# Patient Record
Sex: FEMALE | Race: WHITE | NOT HISPANIC OR LATINO | Employment: FULL TIME | URBAN - NONMETROPOLITAN AREA
[De-identification: names, ages, dates, MRNs, and addresses within clinical notes are randomized per-mention and may not be internally consistent; named-entity substitution may affect disease eponyms.]

---

## 2023-10-05 ENCOUNTER — OFFICE VISIT (OUTPATIENT)
Dept: FAMILY MEDICINE CLINIC | Facility: CLINIC | Age: 33
End: 2023-10-05
Payer: COMMERCIAL

## 2023-10-05 VITALS
OXYGEN SATURATION: 98 % | BODY MASS INDEX: 53.92 KG/M2 | RESPIRATION RATE: 16 BRPM | SYSTOLIC BLOOD PRESSURE: 130 MMHG | WEIGHT: 293 LBS | DIASTOLIC BLOOD PRESSURE: 84 MMHG | TEMPERATURE: 98 F | HEART RATE: 111 BPM | HEIGHT: 62 IN

## 2023-10-05 DIAGNOSIS — E66.01 CLASS 3 SEVERE OBESITY DUE TO EXCESS CALORIES WITH SERIOUS COMORBIDITY AND BODY MASS INDEX (BMI) OF 50.0 TO 59.9 IN ADULT: ICD-10-CM

## 2023-10-05 DIAGNOSIS — E03.8 HYPOTHYROIDISM DUE TO HASHIMOTO'S THYROIDITIS: ICD-10-CM

## 2023-10-05 DIAGNOSIS — F33.0 MAJOR DEPRESSIVE DISORDER, RECURRENT, MILD: ICD-10-CM

## 2023-10-05 DIAGNOSIS — G47.33 OSA (OBSTRUCTIVE SLEEP APNEA): ICD-10-CM

## 2023-10-05 DIAGNOSIS — Z83.3 FAMILY HISTORY OF DIABETES MELLITUS (DM): ICD-10-CM

## 2023-10-05 DIAGNOSIS — F41.9 ANXIETY: ICD-10-CM

## 2023-10-05 DIAGNOSIS — E78.5 HYPERLIPIDEMIA, UNSPECIFIED HYPERLIPIDEMIA TYPE: ICD-10-CM

## 2023-10-05 DIAGNOSIS — E06.3 HYPOTHYROIDISM DUE TO HASHIMOTO'S THYROIDITIS: ICD-10-CM

## 2023-10-05 DIAGNOSIS — Z00.00 ROUTINE GENERAL MEDICAL EXAMINATION AT A HEALTH CARE FACILITY: Primary | ICD-10-CM

## 2023-10-05 DIAGNOSIS — R06.83 SNORING: ICD-10-CM

## 2023-10-05 DIAGNOSIS — I10 ESSENTIAL HYPERTENSION: ICD-10-CM

## 2023-10-05 PROBLEM — J02.0 STREPTOCOCCAL SORE THROAT: Status: ACTIVE | Noted: 2023-10-05

## 2023-10-05 PROBLEM — E66.813 CLASS 3 SEVERE OBESITY DUE TO EXCESS CALORIES WITH SERIOUS COMORBIDITY AND BODY MASS INDEX (BMI) OF 50.0 TO 59.9 IN ADULT: Status: ACTIVE | Noted: 2023-10-05

## 2023-10-05 PROBLEM — J01.90 ACUTE SINUSITIS: Status: ACTIVE | Noted: 2023-10-05

## 2023-10-05 PROBLEM — H66.90 OTITIS MEDIA: Status: ACTIVE | Noted: 2023-10-05

## 2023-10-05 PROBLEM — J06.9 UPPER RESPIRATORY INFECTION: Status: ACTIVE | Noted: 2023-10-05

## 2023-10-05 PROBLEM — R05.9 COUGH: Status: ACTIVE | Noted: 2023-10-05

## 2023-10-05 PROCEDURE — 99203 OFFICE O/P NEW LOW 30 MIN: CPT | Performed by: NURSE PRACTITIONER

## 2023-10-05 PROCEDURE — 99385 PREV VISIT NEW AGE 18-39: CPT | Performed by: NURSE PRACTITIONER

## 2023-10-05 RX ORDER — VENLAFAXINE HYDROCHLORIDE 150 MG/1
150 CAPSULE, EXTENDED RELEASE ORAL DAILY
Qty: 90 CAPSULE | Refills: 1 | Status: SHIPPED | OUTPATIENT
Start: 2023-10-05

## 2023-10-05 RX ORDER — THYROID 30 MG/1
30 TABLET ORAL DAILY
Qty: 90 TABLET | Refills: 1 | Status: SHIPPED | OUTPATIENT
Start: 2023-10-05

## 2023-10-05 RX ORDER — ROSUVASTATIN CALCIUM 10 MG/1
10 TABLET, COATED ORAL DAILY
Qty: 90 TABLET | Refills: 1 | Status: SHIPPED | OUTPATIENT
Start: 2023-10-05

## 2023-10-05 RX ORDER — LISINOPRIL 5 MG/1
5 TABLET ORAL DAILY
COMMUNITY
End: 2023-10-05 | Stop reason: SDUPTHER

## 2023-10-05 RX ORDER — VENLAFAXINE HYDROCHLORIDE 150 MG/1
150 CAPSULE, EXTENDED RELEASE ORAL DAILY
COMMUNITY
End: 2023-10-05 | Stop reason: SDUPTHER

## 2023-10-05 RX ORDER — LISINOPRIL 10 MG/1
10 TABLET ORAL DAILY
Qty: 90 TABLET | Refills: 1 | Status: SHIPPED | OUTPATIENT
Start: 2023-10-05

## 2023-10-05 RX ORDER — THYROID 30 MG/1
30 TABLET ORAL DAILY
COMMUNITY
End: 2023-10-05

## 2023-10-05 RX ORDER — THYROID 30 MG/1
TABLET ORAL
COMMUNITY
Start: 2023-08-25 | End: 2023-10-05 | Stop reason: SDUPTHER

## 2023-10-05 RX ORDER — ROSUVASTATIN CALCIUM 10 MG/1
10 TABLET, COATED ORAL DAILY
COMMUNITY
End: 2023-10-05 | Stop reason: SDUPTHER

## 2023-10-05 NOTE — PROGRESS NOTES
"       New Patient History and Physical      Referring Physician: No ref. provider found    Chief Complaint:    Chief Complaint   Patient presents with    Anxiety    Thyroid Problem     Thyroid issues and anxiety. Establish care        History of Present Illness:   Jamie Heath is a 32 y.o. female who presents today as a new patient.     HTN--lisinopril 5mg once daily, started about 3 months ago--reports that BP was elevated \"due to anxiety\" and eventually went back down but due to family history of HTN, provider wanted her to continue medication    HLD--Crestor 10mg once daily--patient states that she started it about 3 months ago and at the next appointment cholesterol was \"back in range\", provider stated that she wanted patient to continue medication due to family history    Mother--MI June 2022, T2DM  Father--afib, heart disease, pacemaker, HLD, HTN, CKD--passed away in June 2023--also history of T2DM    Patient states that her A1c is usually normal    Hypothyroidism, Hashimoto's--last checked about 3 months ago--takes armour thyroid 30mg once daily--patient states that she does not have any more of her thyroid medication.    Anxiety, Depression--Effexor-XR 150mg once daily--tolerating well, improvement in symptoms, Denies SI/HI    Subjective     Review of Systems   Constitutional:  Negative for fatigue.   Respiratory:  Negative for cough.    Cardiovascular:  Negative for chest pain and palpitations.   Gastrointestinal:  Negative for abdominal pain, diarrhea, nausea and vomiting.   Endocrine: Negative for polydipsia, polyphagia and polyuria.   Genitourinary:  Negative for menstrual problem and pelvic pain.   Musculoskeletal:  Negative for myalgias.   Neurological:  Negative for dizziness, weakness and headaches.   Psychiatric/Behavioral:  Negative for agitation, dysphoric mood, self-injury, sleep disturbance and suicidal ideas.       The following portions of the patient's history were reviewed and updated as " "appropriate: allergies, current medications, past family history, past medical history, past social history, past surgical history and problem list.    Past Medical History: History reviewed. No pertinent past medical history.    Past Surgical History:History reviewed. No pertinent surgical history.    Family History: family history includes Arthritis in her mother; Cancer in her maternal grandfather; Diabetes in her father and mother; Hypertension in her father; Kidney disease in her father; Mental illness in her father; Stroke in her maternal grandmother.    Social History:  reports that she has never smoked. She has never been exposed to tobacco smoke. She has never used smokeless tobacco. Alcohol use questions deferred to the physician. She reports that she does not use drugs.    Medications:    Current Outpatient Medications:     [place] Thyroid 30 MG tablet, Take 1 tablet by mouth Daily., Disp: 90 tablet, Rfl: 1    lisinopril (PRINIVIL,ZESTRIL) 10 MG tablet, Take 1 tablet by mouth Daily., Disp: 90 tablet, Rfl: 1    rosuvastatin (CRESTOR) 10 MG tablet, Take 1 tablet by mouth Daily., Disp: 90 tablet, Rfl: 1    venlafaxine XR (EFFEXOR-XR) 150 MG 24 hr capsule, Take 1 capsule by mouth Daily., Disp: 90 capsule, Rfl: 1    Allergies:  No Known Allergies    Objective     Vital Signs:   /84   Pulse 111   Temp 98 øF (36.7 øC)   Resp 16   Ht 157.5 cm (62\")   Wt 134 kg (295 lb 9.6 oz)   SpO2 98%   BMI 54.07 kg/mý      Class 3 Severe Obesity (BMI >=40). Obesity-related health conditions include the following: hypertension and dyslipidemias. Obesity is newly identified. BMI is is above average; BMI management plan is completed. We discussed Information on healthy weight added to patient's after visit summary.       Physical Exam:  Physical Exam  Vitals and nursing note reviewed.   HENT:      Right Ear: Tympanic membrane normal.      Left Ear: Tympanic membrane normal.      Mouth/Throat:      Pharynx: No " posterior oropharyngeal erythema or uvula swelling.      Comments: Large tongue, crowded airway  Eyes:      Extraocular Movements: Extraocular movements intact.      Pupils: Pupils are equal, round, and reactive to light.   Neck:      Thyroid: No thyroid mass or thyromegaly.      Vascular: Normal carotid pulses. No carotid bruit or JVD.   Cardiovascular:      Rate and Rhythm: Normal rate and regular rhythm.   Pulmonary:      Effort: Pulmonary effort is normal.      Breath sounds: Normal breath sounds.   Abdominal:      General: Bowel sounds are normal.   Musculoskeletal:         General: Normal range of motion.   Lymphadenopathy:      Cervical: No cervical adenopathy.   Skin:     General: Skin is warm and dry.      Capillary Refill: Capillary refill takes less than 2 seconds.   Neurological:      Mental Status: She is alert and oriented to person, place, and time.   Psychiatric:         Mood and Affect: Mood normal.         Behavior: Behavior normal.       Assessment / Plan     Assessment/Plan:   Diagnoses and all orders for this visit:    1. Routine general medical examination at a health care facility (Primary)  -     CBC w AUTO Differential  -     Comprehensive metabolic panel  -     Lipid Panel  -     Hemoglobin A1c  -     TSH  -     T4, free    2. Hypothyroidism due to Hashimoto's thyroiditis  -      Thyroid  -     TSH  -     T4, free  -     [place] Thyroid 30 MG tablet; Take 1 tablet by mouth Daily.  Dispense: 90 tablet; Refill: 1    3. Hyperlipidemia, unspecified hyperlipidemia type  -     rosuvastatin (CRESTOR) 10 MG tablet; Take 1 tablet by mouth Daily.  Dispense: 90 tablet; Refill: 1    4. Essential hypertension  -     lisinopril (PRINIVIL,ZESTRIL) 10 MG tablet; Take 1 tablet by mouth Daily.  Dispense: 90 tablet; Refill: 1    5. Family history of diabetes mellitus (DM)  -     Hemoglobin A1c    6. Class 3 severe obesity due to excess calories with serious comorbidity and body mass index (BMI) of 50.0 to  59.9 in adult  -     Hemoglobin A1c    7. Anxiety  -     venlafaxine XR (EFFEXOR-XR) 150 MG 24 hr capsule; Take 1 capsule by mouth Daily.  Dispense: 90 capsule; Refill: 1    8. Major depressive disorder, recurrent, mild  -     venlafaxine XR (EFFEXOR-XR) 150 MG 24 hr capsule; Take 1 capsule by mouth Daily.  Dispense: 90 capsule; Refill: 1    9. Snoring  -     Ambulatory Referral to Sleep Medicine    10. GREG (obstructive sleep apnea)  -     Ambulatory Referral to Sleep Medicine    I will contact patient regarding test results and provide instructions regarding any necessary changes in plan of care.    Patient was encouraged to keep me informed of any acute changes, lack of improvement, or any new concerning symptoms.    Patient voiced understanding of all instructions and denied further questions.      I have reviewed and updated all copied forward information, as appropriate.  I attest to the accuracy and relevance of any unchanged information.        Follow up:  Return in about 3 months (around 1/5/2024).     Patient Education:  There are no Patient Instructions on file for this visit.    GRACIELA Jaffe  10/05/23  15:14 EDT    Please note that portions of this note may have been completed with a voice recognition program.

## 2023-10-06 ENCOUNTER — PATIENT ROUNDING (BHMG ONLY) (OUTPATIENT)
Dept: FAMILY MEDICINE CLINIC | Facility: CLINIC | Age: 33
End: 2023-10-06
Payer: COMMERCIAL

## 2023-10-06 LAB
ALBUMIN SERPL-MCNC: 4.6 G/DL (ref 3.9–4.9)
ALBUMIN/GLOB SERPL: 1.6 {RATIO} (ref 1.2–2.2)
ALP SERPL-CCNC: 65 IU/L (ref 44–121)
ALT SERPL-CCNC: 20 IU/L (ref 0–32)
AST SERPL-CCNC: 21 IU/L (ref 0–40)
BASOPHILS # BLD AUTO: 0 X10E3/UL (ref 0–0.2)
BASOPHILS NFR BLD AUTO: 0 %
BILIRUB SERPL-MCNC: <0.2 MG/DL (ref 0–1.2)
BUN SERPL-MCNC: 15 MG/DL (ref 6–20)
BUN/CREAT SERPL: 19 (ref 9–23)
CALCIUM SERPL-MCNC: 9.6 MG/DL (ref 8.7–10.2)
CHLORIDE SERPL-SCNC: 100 MMOL/L (ref 96–106)
CHOLEST SERPL-MCNC: 174 MG/DL (ref 100–199)
CO2 SERPL-SCNC: 25 MMOL/L (ref 20–29)
CREAT SERPL-MCNC: 0.81 MG/DL (ref 0.57–1)
EGFRCR SERPLBLD CKD-EPI 2021: 98 ML/MIN/1.73
EOSINOPHIL # BLD AUTO: 0 X10E3/UL (ref 0–0.4)
EOSINOPHIL NFR BLD AUTO: 0 %
ERYTHROCYTE [DISTWIDTH] IN BLOOD BY AUTOMATED COUNT: 13 % (ref 11.7–15.4)
GLOBULIN SER CALC-MCNC: 2.8 G/DL (ref 1.5–4.5)
GLUCOSE SERPL-MCNC: 91 MG/DL (ref 70–99)
HBA1C MFR BLD: 5 % (ref 4.8–5.6)
HCT VFR BLD AUTO: 35.3 % (ref 34–46.6)
HDLC SERPL-MCNC: 40 MG/DL
HGB BLD-MCNC: 11.3 G/DL (ref 11.1–15.9)
IMM GRANULOCYTES # BLD AUTO: 0 X10E3/UL (ref 0–0.1)
IMM GRANULOCYTES NFR BLD AUTO: 0 %
LDLC SERPL CALC-MCNC: 102 MG/DL (ref 0–99)
LYMPHOCYTES # BLD AUTO: 2.3 X10E3/UL (ref 0.7–3.1)
LYMPHOCYTES NFR BLD AUTO: 38 %
MCH RBC QN AUTO: 27.4 PG (ref 26.6–33)
MCHC RBC AUTO-ENTMCNC: 32 G/DL (ref 31.5–35.7)
MCV RBC AUTO: 86 FL (ref 79–97)
MONOCYTES # BLD AUTO: 0.5 X10E3/UL (ref 0.1–0.9)
MONOCYTES NFR BLD AUTO: 8 %
NEUTROPHILS # BLD AUTO: 3.3 X10E3/UL (ref 1.4–7)
NEUTROPHILS NFR BLD AUTO: 54 %
PLATELET # BLD AUTO: 259 X10E3/UL (ref 150–450)
POTASSIUM SERPL-SCNC: 4 MMOL/L (ref 3.5–5.2)
PROT SERPL-MCNC: 7.4 G/DL (ref 6–8.5)
RBC # BLD AUTO: 4.12 X10E6/UL (ref 3.77–5.28)
SODIUM SERPL-SCNC: 138 MMOL/L (ref 134–144)
T4 FREE SERPL-MCNC: 0.97 NG/DL (ref 0.82–1.77)
TRIGL SERPL-MCNC: 184 MG/DL (ref 0–149)
TSH SERPL DL<=0.005 MIU/L-ACNC: 1.52 UIU/ML (ref 0.45–4.5)
VLDLC SERPL CALC-MCNC: 32 MG/DL (ref 5–40)
WBC # BLD AUTO: 6.1 X10E3/UL (ref 3.4–10.8)

## 2024-02-09 ENCOUNTER — OFFICE VISIT (OUTPATIENT)
Dept: FAMILY MEDICINE CLINIC | Facility: CLINIC | Age: 34
End: 2024-02-09
Payer: COMMERCIAL

## 2024-02-09 VITALS
RESPIRATION RATE: 16 BRPM | SYSTOLIC BLOOD PRESSURE: 130 MMHG | BODY MASS INDEX: 53.92 KG/M2 | OXYGEN SATURATION: 99 % | TEMPERATURE: 97.9 F | WEIGHT: 293 LBS | DIASTOLIC BLOOD PRESSURE: 86 MMHG | HEART RATE: 122 BPM | HEIGHT: 62 IN

## 2024-02-09 DIAGNOSIS — I10 ESSENTIAL HYPERTENSION: ICD-10-CM

## 2024-02-09 DIAGNOSIS — E61.1 IRON DEFICIENCY: ICD-10-CM

## 2024-02-09 DIAGNOSIS — E78.5 DYSLIPIDEMIA: ICD-10-CM

## 2024-02-09 DIAGNOSIS — E06.3 HYPOTHYROIDISM DUE TO HASHIMOTO'S THYROIDITIS: Primary | ICD-10-CM

## 2024-02-09 DIAGNOSIS — E03.8 HYPOTHYROIDISM DUE TO HASHIMOTO'S THYROIDITIS: Primary | ICD-10-CM

## 2024-02-09 DIAGNOSIS — R53.83 OTHER FATIGUE: ICD-10-CM

## 2024-02-09 DIAGNOSIS — E55.9 VITAMIN D DEFICIENCY: ICD-10-CM

## 2024-02-09 DIAGNOSIS — F33.0 MAJOR DEPRESSIVE DISORDER, RECURRENT, MILD: ICD-10-CM

## 2024-02-09 DIAGNOSIS — F41.9 ANXIETY: ICD-10-CM

## 2024-02-09 DIAGNOSIS — E53.8 B12 DEFICIENCY: ICD-10-CM

## 2024-02-09 RX ORDER — LISINOPRIL 10 MG/1
10 TABLET ORAL DAILY
Qty: 90 TABLET | Refills: 1 | Status: SHIPPED | OUTPATIENT
Start: 2024-02-09

## 2024-02-09 RX ORDER — VENLAFAXINE HYDROCHLORIDE 150 MG/1
150 CAPSULE, EXTENDED RELEASE ORAL DAILY
Qty: 90 CAPSULE | Refills: 1 | Status: SHIPPED | OUTPATIENT
Start: 2024-02-09

## 2024-02-09 RX ORDER — ROSUVASTATIN CALCIUM 10 MG/1
10 TABLET, COATED ORAL DAILY
Qty: 90 TABLET | Refills: 1 | Status: SHIPPED | OUTPATIENT
Start: 2024-02-09

## 2024-02-09 NOTE — PROGRESS NOTES
"       New Patient History and Physical      Referring Physician: No ref. provider found    Chief Complaint:    Chief Complaint   Patient presents with    Hypothyroidism     Pt is here to have labs done and to follow up on medications          History of Present Illness:   Jamie Heath is a 33 y.o. female who presents today for follow up of hypothyroidism and concerns of fatigue and dizziness.     Patient reports that she is exhausted \"all the time\". Also states that she gets \"woozy\" when she stands up, intermittently.     Curently prescribed armour thyroid 30mg daily.    HTN--Lisinopril 10mg daily, tolerating well, denies AE, chest pain, SOA--has been experiencing intermittent dizziness upon standing    HLD--rosuvastatin 10mg daily--tolerating well    Subjective     Review of Systems   Constitutional:  Positive for fatigue.   Respiratory:  Negative for cough.    Cardiovascular:  Negative for chest pain and palpitations.   Gastrointestinal:  Negative for abdominal pain, diarrhea, nausea and vomiting.   Endocrine: Negative for polydipsia, polyphagia and polyuria.   Genitourinary:  Negative for menstrual problem and pelvic pain.   Musculoskeletal:  Negative for myalgias.   Neurological:  Positive for dizziness. Negative for weakness and headaches.   Psychiatric/Behavioral:  Negative for agitation, dysphoric mood, self-injury, sleep disturbance and suicidal ideas.         The following portions of the patient's history were reviewed and updated as appropriate: allergies, current medications, past family history, past medical history, past social history, past surgical history and problem list.    Past Medical History: History reviewed. No pertinent past medical history.    Past Surgical History:History reviewed. No pertinent surgical history.    Family History: family history includes Arthritis in her mother; Cancer in her maternal grandfather; Diabetes in her father and mother; Hypertension in her father; Kidney " "disease in her father; Mental illness in her father; Stroke in her maternal grandmother.    Social History:  reports that she has never smoked. She has never been exposed to tobacco smoke. She has never used smokeless tobacco. Alcohol use questions deferred to the physician. She reports that she does not use drugs.    Medications:    Current Outpatient Medications:     [place] Thyroid 30 MG tablet, Take 1 tablet by mouth Daily., Disp: 90 tablet, Rfl: 1    lisinopril (PRINIVIL,ZESTRIL) 10 MG tablet, Take 1 tablet by mouth Daily., Disp: 90 tablet, Rfl: 1    rosuvastatin (CRESTOR) 10 MG tablet, Take 1 tablet by mouth Daily., Disp: 90 tablet, Rfl: 1    venlafaxine XR (EFFEXOR-XR) 150 MG 24 hr capsule, Take 1 capsule by mouth Daily., Disp: 90 capsule, Rfl: 1    Allergies:  No Known Allergies    Objective     Vital Signs:   /86   Pulse (!) 122   Temp 97.9 °F (36.6 °C)   Resp 16   Ht 157.5 cm (62\")   Wt 136 kg (299 lb 6.4 oz)   SpO2 99%   BMI 54.76 kg/m²      Class 3 Severe Obesity (BMI >=40). Obesity-related health conditions include the following: hypertension and dyslipidemias. Obesity is newly identified. BMI is is above average; BMI management plan is completed. We discussed Information on healthy weight added to patient's after visit summary.       Physical Exam:  Physical Exam  Vitals and nursing note reviewed.   Constitutional:       Appearance: She is obese.   Neck:      Thyroid: No thyroid mass or thyromegaly.   Cardiovascular:      Rate and Rhythm: Normal rate and regular rhythm.   Pulmonary:      Effort: Pulmonary effort is normal.      Breath sounds: Normal breath sounds.   Neurological:      Mental Status: She is alert and oriented to person, place, and time.   Psychiatric:         Mood and Affect: Mood normal.         Behavior: Behavior normal.         Assessment / Plan     Assessment/Plan:   Diagnoses and all orders for this visit:    1. Hypothyroidism due to Hashimoto's thyroiditis (Primary)  - "     TSH  -     T4, free    2. Essential hypertension  -     Comprehensive metabolic panel  -     lisinopril (PRINIVIL,ZESTRIL) 10 MG tablet; Take 1 tablet by mouth Daily.  Dispense: 90 tablet; Refill: 1    3. Vitamin D deficiency  -     Vitamin D 25 hydroxy    4. B12 deficiency  -     Vitamin B12    5. Iron deficiency  -     Ferritin    6. Other fatigue  -     CBC w AUTO Differential  -     Comprehensive metabolic panel    7. Dyslipidemia  -     Lipid Panel  -     rosuvastatin (CRESTOR) 10 MG tablet; Take 1 tablet by mouth Daily.  Dispense: 90 tablet; Refill: 1    8. Anxiety  -     venlafaxine XR (EFFEXOR-XR) 150 MG 24 hr capsule; Take 1 capsule by mouth Daily.  Dispense: 90 capsule; Refill: 1    9. Major depressive disorder, recurrent, mild  -     venlafaxine XR (EFFEXOR-XR) 150 MG 24 hr capsule; Take 1 capsule by mouth Daily.  Dispense: 90 capsule; Refill: 1      I will contact patient regarding test results and provide instructions regarding any necessary changes in plan of care.    Patient was encouraged to keep me informed of any acute changes, lack of improvement, or any new concerning symptoms.    Patient voiced understanding of all instructions and denied further questions.      I have reviewed and updated all copied forward information, as appropriate.  I attest to the accuracy and relevance of any unchanged information.        Follow up:  Return in about 3 months (around 5/9/2024).     Patient Education:  There are no Patient Instructions on file for this visit.    GRACIELA Jaffe  02/27/24  16:05 EST    Please note that portions of this note may have been completed with a voice recognition program.    Declines

## 2024-02-10 LAB
25(OH)D3+25(OH)D2 SERPL-MCNC: 24.7 NG/ML (ref 30–100)
ALBUMIN SERPL-MCNC: 4.4 G/DL (ref 3.5–5.2)
ALBUMIN/GLOB SERPL: 1.9 G/DL
ALP SERPL-CCNC: 71 U/L (ref 39–117)
ALT SERPL-CCNC: 17 U/L (ref 1–33)
AST SERPL-CCNC: 21 U/L (ref 1–32)
BASOPHILS # BLD AUTO: 0 10*3/MM3 (ref 0–0.2)
BASOPHILS NFR BLD AUTO: 0 % (ref 0–1.5)
BILIRUB SERPL-MCNC: 0.3 MG/DL (ref 0–1.2)
BUN SERPL-MCNC: 14 MG/DL (ref 6–20)
BUN/CREAT SERPL: 15.7 (ref 7–25)
CALCIUM SERPL-MCNC: 9.5 MG/DL (ref 8.6–10.5)
CHLORIDE SERPL-SCNC: 104 MMOL/L (ref 98–107)
CHOLEST SERPL-MCNC: 141 MG/DL (ref 0–200)
CO2 SERPL-SCNC: 23.3 MMOL/L (ref 22–29)
CREAT SERPL-MCNC: 0.89 MG/DL (ref 0.57–1)
EGFRCR SERPLBLD CKD-EPI 2021: 86.8 ML/MIN/1.73
EOSINOPHIL # BLD AUTO: 0 10*3/MM3 (ref 0–0.4)
EOSINOPHIL NFR BLD AUTO: 0 % (ref 0.3–6.2)
ERYTHROCYTE [DISTWIDTH] IN BLOOD BY AUTOMATED COUNT: 14.5 % (ref 12.3–15.4)
FERRITIN SERPL-MCNC: 13.8 NG/ML (ref 13–150)
GLOBULIN SER CALC-MCNC: 2.3 GM/DL
GLUCOSE SERPL-MCNC: 185 MG/DL (ref 65–99)
HCT VFR BLD AUTO: 33 % (ref 34–46.6)
HDLC SERPL-MCNC: 42 MG/DL (ref 40–60)
HGB BLD-MCNC: 10.8 G/DL (ref 12–15.9)
IMM GRANULOCYTES # BLD AUTO: 0.01 10*3/MM3 (ref 0–0.05)
IMM GRANULOCYTES NFR BLD AUTO: 0.2 % (ref 0–0.5)
LDLC SERPL CALC-MCNC: 71 MG/DL (ref 0–100)
LYMPHOCYTES # BLD AUTO: 2.2 10*3/MM3 (ref 0.7–3.1)
LYMPHOCYTES NFR BLD AUTO: 35 % (ref 19.6–45.3)
MCH RBC QN AUTO: 26.8 PG (ref 26.6–33)
MCHC RBC AUTO-ENTMCNC: 32.7 G/DL (ref 31.5–35.7)
MCV RBC AUTO: 81.9 FL (ref 79–97)
MONOCYTES # BLD AUTO: 0.33 10*3/MM3 (ref 0.1–0.9)
MONOCYTES NFR BLD AUTO: 5.2 % (ref 5–12)
NEUTROPHILS # BLD AUTO: 3.75 10*3/MM3 (ref 1.7–7)
NEUTROPHILS NFR BLD AUTO: 59.6 % (ref 42.7–76)
NRBC BLD AUTO-RTO: 0 /100 WBC (ref 0–0.2)
PLATELET # BLD AUTO: 326 10*3/MM3 (ref 140–450)
POTASSIUM SERPL-SCNC: 4.2 MMOL/L (ref 3.5–5.2)
PROT SERPL-MCNC: 6.7 G/DL (ref 6–8.5)
RBC # BLD AUTO: 4.03 10*6/MM3 (ref 3.77–5.28)
SODIUM SERPL-SCNC: 138 MMOL/L (ref 136–145)
T4 FREE SERPL-MCNC: 0.96 NG/DL (ref 0.93–1.7)
TRIGL SERPL-MCNC: 162 MG/DL (ref 0–150)
TSH SERPL DL<=0.005 MIU/L-ACNC: 2.12 UIU/ML (ref 0.27–4.2)
VIT B12 SERPL-MCNC: 414 PG/ML (ref 211–946)
VLDLC SERPL CALC-MCNC: 28 MG/DL (ref 5–40)
WBC # BLD AUTO: 6.29 10*3/MM3 (ref 3.4–10.8)

## 2024-10-23 DIAGNOSIS — F41.9 ANXIETY: ICD-10-CM

## 2024-10-23 DIAGNOSIS — F33.0 MAJOR DEPRESSIVE DISORDER, RECURRENT, MILD: ICD-10-CM

## 2024-10-23 RX ORDER — VENLAFAXINE HYDROCHLORIDE 150 MG/1
150 CAPSULE, EXTENDED RELEASE ORAL DAILY
Qty: 90 CAPSULE | Refills: 1 | Status: SHIPPED | OUTPATIENT
Start: 2024-10-23

## 2024-11-01 ENCOUNTER — OFFICE VISIT (OUTPATIENT)
Dept: FAMILY MEDICINE CLINIC | Facility: CLINIC | Age: 34
End: 2024-11-01
Payer: COMMERCIAL

## 2024-11-01 VITALS
BODY MASS INDEX: 52.44 KG/M2 | SYSTOLIC BLOOD PRESSURE: 128 MMHG | WEIGHT: 285 LBS | HEIGHT: 62 IN | HEART RATE: 99 BPM | DIASTOLIC BLOOD PRESSURE: 84 MMHG | OXYGEN SATURATION: 98 %

## 2024-11-01 DIAGNOSIS — E78.5 DYSLIPIDEMIA: ICD-10-CM

## 2024-11-01 DIAGNOSIS — I10 ESSENTIAL HYPERTENSION: ICD-10-CM

## 2024-11-01 DIAGNOSIS — F90.2 ATTENTION DEFICIT HYPERACTIVITY DISORDER (ADHD), COMBINED TYPE: ICD-10-CM

## 2024-11-01 DIAGNOSIS — F33.0 MAJOR DEPRESSIVE DISORDER, RECURRENT, MILD: ICD-10-CM

## 2024-11-01 DIAGNOSIS — F41.9 ANXIETY: Primary | ICD-10-CM

## 2024-11-01 DIAGNOSIS — E06.3 HYPOTHYROIDISM DUE TO HASHIMOTO'S THYROIDITIS: ICD-10-CM

## 2024-11-01 PROCEDURE — 99214 OFFICE O/P EST MOD 30 MIN: CPT | Performed by: NURSE PRACTITIONER

## 2024-11-01 RX ORDER — DESVENLAFAXINE 25 MG/1
TABLET, EXTENDED RELEASE ORAL
Qty: 147 TABLET | Refills: 0 | Status: SHIPPED | OUTPATIENT
Start: 2024-11-01 | End: 2025-01-17

## 2024-11-01 RX ORDER — VENLAFAXINE HYDROCHLORIDE 150 MG/1
150 CAPSULE, EXTENDED RELEASE ORAL DAILY
Qty: 90 CAPSULE | Refills: 1 | Status: SHIPPED | OUTPATIENT
Start: 2024-11-01 | End: 2024-11-01

## 2024-11-01 RX ORDER — THYROID 30 MG/1
30 TABLET ORAL DAILY
Qty: 90 TABLET | Refills: 1 | Status: SHIPPED | OUTPATIENT
Start: 2024-11-01

## 2024-11-01 RX ORDER — ROSUVASTATIN CALCIUM 10 MG/1
10 TABLET, COATED ORAL DAILY
Qty: 90 TABLET | Refills: 1 | Status: SHIPPED | OUTPATIENT
Start: 2024-11-01

## 2024-11-01 RX ORDER — LISINOPRIL 10 MG/1
10 TABLET ORAL DAILY
Qty: 90 TABLET | Refills: 1 | Status: SHIPPED | OUTPATIENT
Start: 2024-11-01 | End: 2024-11-01

## 2024-11-01 NOTE — PROGRESS NOTES
Established Patient        Chief Complaint:   Chief Complaint   Patient presents with    Med Refill         History of Present Illness:    Jamie Heath is a 33 y.o. female who presents today for follow up and medication refill.  Patient has been without medical insurance and has been unable to refill his prescription medications.    Anxiety, Depression  Effrexor-XR 150mg daily previously prescribed. Has not taken in 3 weeks.  Would like to try different medication. Does not notice much of a difference without the medication.     HLD  Crestor 10mg daily previously prescribed  Has not taken in 2 months due to change in insurance.    Hypothyroidism  Previously on armour thyroid 30mg daily--has not taken in 1 month.  Tolerated well    HTN  Previously prescribed Lisinopril 10mg daily. Tolerated well.     Denies chest pain, SOA, palpitations, severe HA, confusion, visual disturbance, dizziness.     Denies SI/HI.     Reports difficulty focusing on tasks. Concerned that she may have ADHD.     Subjective     The following portions of the patient's history were reviewed and updated as appropriate: allergies, current medications, past family history, past medical history, past social history, past surgical history and problem list.    ALLERGIES  No Known Allergies    ROS  Review of Systems   Constitutional:  Negative for unexpected weight change.   Eyes:  Negative for visual disturbance.   Respiratory:  Negative for chest tightness and shortness of breath.    Cardiovascular:  Negative for chest pain and palpitations.   Endocrine: Negative for polydipsia, polyphagia and polyuria.   Neurological:  Negative for dizziness and light-headedness.   Psychiatric/Behavioral:  Positive for agitation, decreased concentration and dysphoric mood. Negative for confusion, self-injury and suicidal ideas. The patient is nervous/anxious.    All other systems reviewed and are negative.        Objective     Vital Signs:   BP  "128/84   Pulse 99   Ht 157.5 cm (62\")   Wt 129 kg (285 lb)   SpO2 98%   BMI 52.13 kg/m²                Physical Exam   Physical Exam  Vitals and nursing note reviewed.   HENT:      Nose: Nose normal.      Mouth/Throat:      Lips: Pink.   Eyes:      General: Lids are normal.      Conjunctiva/sclera: Conjunctivae normal.      Pupils: Pupils are equal, round, and reactive to light.   Cardiovascular:      Rate and Rhythm: Normal rate and regular rhythm.      Heart sounds: Normal heart sounds.   Pulmonary:      Effort: Pulmonary effort is normal.      Breath sounds: Normal breath sounds.   Neurological:      Mental Status: She is alert and oriented to person, place, and time.      Gait: Gait is intact.   Psychiatric:         Attention and Perception: Attention normal.         Mood and Affect: Mood and affect normal.         Speech: Speech normal.         Behavior: Behavior normal. Behavior is cooperative.         Assessment and Plan      Assessment/Plan:   Diagnoses and all orders for this visit:    1. Anxiety (Primary)  -     Discontinue: venlafaxine XR (EFFEXOR-XR) 150 MG 24 hr capsule; Take 1 capsule by mouth Daily.  Dispense: 90 capsule; Refill: 1  -     Desvenlafaxine Succinate ER (Pristiq) 25 MG tablet sustained-release 24 hour; Take 1 tablet by mouth Daily for 7 days, THEN 2 tablets Daily for 35 days, THEN 2 tablets Daily for 35 days.  Dispense: 147 tablet; Refill: 0    2. Major depressive disorder, recurrent, mild  -     Discontinue: venlafaxine XR (EFFEXOR-XR) 150 MG 24 hr capsule; Take 1 capsule by mouth Daily.  Dispense: 90 capsule; Refill: 1  -     Desvenlafaxine Succinate ER (Pristiq) 25 MG tablet sustained-release 24 hour; Take 1 tablet by mouth Daily for 7 days, THEN 2 tablets Daily for 35 days, THEN 2 tablets Daily for 35 days.  Dispense: 147 tablet; Refill: 0    3. Dyslipidemia  -     rosuvastatin (CRESTOR) 10 MG tablet; Take 1 tablet by mouth Daily.  Dispense: 90 tablet; Refill: 1  -     Lipid " Panel    4. Essential hypertension  -     Discontinue: lisinopril (PRINIVIL,ZESTRIL) 10 MG tablet; Take 1 tablet by mouth Daily.  Dispense: 90 tablet; Refill: 1  -     Comprehensive metabolic panel    5. Hypothyroidism due to Hashimoto's thyroiditis  -     [place] Thyroid 30 MG tablet; Take 1 tablet by mouth Daily.  Dispense: 90 tablet; Refill: 1  -     TSH  -     T4, free    6. Attention deficit hyperactivity disorder (ADHD), combined type    Patient to return to office in 4 to 6 weeks to discuss treatment of ADHD.    Risks, benefits, and potential side effects of current/new medications reviewed with patient.  Patient voiced understanding and wished to proceed with treatment.    I will contact patient regarding test results and provide instructions regarding any necessary changes in plan of care.    Patient to increase dietary intake of vegetables, fruits, nuts, whole grains and fish. Decrease dietary intake of carbs, processed foods such as lunch meats, saturated fats, sugary beverages.     Increase exercise regimen as tolerated toward a goal of 120-150 minutes/week.     Patient was encouraged to keep me informed of any acute changes, lack of improvement, or any new concerning symptoms.    Discussion Summary:  Discussed plan of care in detail with pt today; pt verb understanding and agrees.      I have reviewed and updated all copied forward information, as appropriate.  I attest to the accuracy and relevance of any unchanged information.      Follow up:  No follow-ups on file.     Patient Education:  There are no Patient Instructions on file for this visit.    GRACIELA Jaffe  11/01/24  13:56 EDT          Please note that portions of this note may have been completed with a voice recognition program.

## 2024-11-02 LAB
ALBUMIN SERPL-MCNC: 4.3 G/DL (ref 3.5–5.2)
ALBUMIN/GLOB SERPL: 1.4 G/DL
ALP SERPL-CCNC: 84 U/L (ref 39–117)
ALT SERPL-CCNC: 28 U/L (ref 1–33)
AST SERPL-CCNC: 30 U/L (ref 1–32)
BILIRUB SERPL-MCNC: 0.4 MG/DL (ref 0–1.2)
BUN SERPL-MCNC: 10 MG/DL (ref 6–20)
BUN/CREAT SERPL: 12.8 (ref 7–25)
CALCIUM SERPL-MCNC: 10 MG/DL (ref 8.6–10.5)
CHLORIDE SERPL-SCNC: 103 MMOL/L (ref 98–107)
CHOLEST SERPL-MCNC: 208 MG/DL (ref 0–200)
CO2 SERPL-SCNC: 27.3 MMOL/L (ref 22–29)
CREAT SERPL-MCNC: 0.78 MG/DL (ref 0.57–1)
EGFRCR SERPLBLD CKD-EPI 2021: 101.7 ML/MIN/1.73
GLOBULIN SER CALC-MCNC: 3 GM/DL
GLUCOSE SERPL-MCNC: 123 MG/DL (ref 65–99)
HDLC SERPL-MCNC: 40 MG/DL (ref 40–60)
LDLC SERPL CALC-MCNC: 147 MG/DL (ref 0–100)
POTASSIUM SERPL-SCNC: 4.2 MMOL/L (ref 3.5–5.2)
PROT SERPL-MCNC: 7.3 G/DL (ref 6–8.5)
SODIUM SERPL-SCNC: 140 MMOL/L (ref 136–145)
T4 FREE SERPL-MCNC: 1.08 NG/DL (ref 0.92–1.68)
TRIGL SERPL-MCNC: 117 MG/DL (ref 0–150)
TSH SERPL DL<=0.005 MIU/L-ACNC: 1.47 UIU/ML (ref 0.27–4.2)
VLDLC SERPL CALC-MCNC: 21 MG/DL (ref 5–40)

## 2024-12-04 DIAGNOSIS — F41.9 ANXIETY: ICD-10-CM

## 2024-12-04 DIAGNOSIS — F33.0 MAJOR DEPRESSIVE DISORDER, RECURRENT, MILD: ICD-10-CM

## 2024-12-04 RX ORDER — DESVENLAFAXINE 25 MG/1
TABLET, EXTENDED RELEASE ORAL
Qty: 49 TABLET | Refills: 0 | Status: SHIPPED | OUTPATIENT
Start: 2024-12-04 | End: 2024-12-05

## 2024-12-05 ENCOUNTER — OFFICE VISIT (OUTPATIENT)
Dept: FAMILY MEDICINE CLINIC | Facility: CLINIC | Age: 34
End: 2024-12-05
Payer: COMMERCIAL

## 2024-12-05 VITALS
HEIGHT: 62 IN | WEIGHT: 284.2 LBS | HEART RATE: 89 BPM | OXYGEN SATURATION: 99 % | BODY MASS INDEX: 52.3 KG/M2 | DIASTOLIC BLOOD PRESSURE: 70 MMHG | SYSTOLIC BLOOD PRESSURE: 112 MMHG

## 2024-12-05 DIAGNOSIS — E06.3 HYPOTHYROIDISM DUE TO HASHIMOTO'S THYROIDITIS: ICD-10-CM

## 2024-12-05 DIAGNOSIS — F33.0 MAJOR DEPRESSIVE DISORDER, RECURRENT, MILD: ICD-10-CM

## 2024-12-05 DIAGNOSIS — F41.9 ANXIETY: Primary | ICD-10-CM

## 2024-12-05 RX ORDER — LEVOTHYROXINE SODIUM 25 UG/1
25 TABLET ORAL
Qty: 30 TABLET | Refills: 2 | Status: SHIPPED | OUTPATIENT
Start: 2024-12-05

## 2024-12-05 RX ORDER — DESVENLAFAXINE 25 MG/1
25 TABLET, EXTENDED RELEASE ORAL DAILY
Qty: 30 TABLET | Refills: 1 | Status: SHIPPED | OUTPATIENT
Start: 2024-12-05

## 2024-12-05 RX ORDER — DESVENLAFAXINE 25 MG/1
25 TABLET, EXTENDED RELEASE ORAL DAILY
Qty: 30 TABLET | Refills: 0 | Status: SHIPPED | OUTPATIENT
Start: 2024-12-05 | End: 2024-12-05 | Stop reason: SDUPTHER

## 2024-12-05 NOTE — PROGRESS NOTES
"                      Established Patient        Chief Complaint:   Chief Complaint   Patient presents with    Anxiety     4WK F/U          History of Present Illness:    Jamie Heath is a 33 y.o. female who presents today for follow up of anxiety, hypothyroidism.    Anxiety  Pristiq 25mg   Tolerating well  Denies SI/HI    Hypothyroidism  Patient would like to try levothyroxine. [place] thyroid is expensive and patient is unable to afford at this time.     Subjective     The following portions of the patient's history were reviewed and updated as appropriate: allergies, current medications, past family history, past medical history, past social history, past surgical history and problem list.    ALLERGIES  No Known Allergies    ROS  Review of Systems   Constitutional:  Negative for unexpected weight change.   Eyes:  Negative for visual disturbance.   Respiratory:  Negative for chest tightness and shortness of breath.    Cardiovascular:  Negative for chest pain and palpitations.   Endocrine: Negative for polydipsia, polyphagia and polyuria.   Neurological:  Negative for dizziness and light-headedness.   Psychiatric/Behavioral:  Negative for agitation, self-injury and suicidal ideas. The patient is not nervous/anxious.    All other systems reviewed and are negative.        Objective     Vital Signs:   /70   Pulse 89   Ht 157.5 cm (62\")   Wt 129 kg (284 lb 3.2 oz)   SpO2 99%   BMI 51.98 kg/m²                Physical Exam   Physical Exam  Vitals and nursing note reviewed.   HENT:      Nose: Nose normal.      Mouth/Throat:      Lips: Pink.   Eyes:      General: Lids are normal.      Conjunctiva/sclera: Conjunctivae normal.      Pupils: Pupils are equal, round, and reactive to light.   Cardiovascular:      Rate and Rhythm: Normal rate and regular rhythm.      Heart sounds: Normal heart sounds.   Pulmonary:      Effort: Pulmonary effort is normal.      Breath sounds: Normal breath sounds.   Neurological:      " "Mental Status: She is alert and oriented to person, place, and time.      Gait: Gait is intact.   Psychiatric:         Attention and Perception: Attention normal.         Mood and Affect: Mood and affect normal.         Speech: Speech normal.         Behavior: Behavior normal. Behavior is cooperative.         Assessment and Plan      Assessment/Plan:   Diagnoses and all orders for this visit:    1. Anxiety (Primary)  -     Discontinue: Desvenlafaxine Succinate ER (Pristiq) 25 MG tablet sustained-release 24 hour; Take 1 tablet by mouth Daily.  Dispense: 30 tablet; Refill: 0  -     Desvenlafaxine Succinate ER (Pristiq) 25 MG tablet sustained-release 24 hour; Take 1 tablet by mouth Daily.  Dispense: 30 tablet; Refill: 1    2. Major depressive disorder, recurrent, mild  -     Discontinue: Desvenlafaxine Succinate ER (Pristiq) 25 MG tablet sustained-release 24 hour; Take 1 tablet by mouth Daily.  Dispense: 30 tablet; Refill: 0  -     Desvenlafaxine Succinate ER (Pristiq) 25 MG tablet sustained-release 24 hour; Take 1 tablet by mouth Daily.  Dispense: 30 tablet; Refill: 1    3. Hypothyroidism due to Hashimoto's thyroiditis  -     levothyroxine (SYNTHROID, LEVOTHROID) 25 MCG tablet; Take 1 tablet by mouth Every Morning.  Dispense: 30 tablet; Refill: 2    Risks, benefits, and potential side effects of current/new medications reviewed with patient.  Patient voiced understanding and wished to proceed with treatment.    Discussed need for stress/anxiety reducing techniques such as prayer/meditation/breathing and counting exercises and avoidance of stress producing environments/situations; will follow clinically.    Encouraged to contact [facility] at [phone] for additional behavioral health resources. Lines are available 7am-7pm seven days a week.     Patient encouraged to call \"[phone]\" for suicidal or homicidal ideations.     May also contact the triage line at [facility] in " [place], [place] 24 hours per day, seven days per week. [phone]    Patient was encouraged to keep me informed of any acute changes, lack of improvement, or any new concerning symptoms.      Discussion Summary:  Discussed plan of care in detail with pt today; pt verb understanding and agrees.        I have reviewed and updated all copied forward information, as appropriate.  I attest to the accuracy and relevance of any unchanged information.      Follow up:  No follow-ups on file.     Patient Education:  There are no Patient Instructions on file for this visit.    GRACIELA Jaffe  12/22/24  21:01 EST          Please note that portions of this note may have been completed with a voice recognition program.

## 2025-01-30 DIAGNOSIS — F33.0 MAJOR DEPRESSIVE DISORDER, RECURRENT, MILD: ICD-10-CM

## 2025-01-30 DIAGNOSIS — F41.9 ANXIETY: ICD-10-CM

## 2025-01-30 RX ORDER — DESVENLAFAXINE 25 MG/1
25 TABLET, EXTENDED RELEASE ORAL DAILY
Qty: 30 TABLET | Refills: 1 | Status: SHIPPED | OUTPATIENT
Start: 2025-01-30

## 2025-03-01 DIAGNOSIS — E06.3 HYPOTHYROIDISM DUE TO HASHIMOTO'S THYROIDITIS: ICD-10-CM

## 2025-03-03 RX ORDER — LEVOTHYROXINE SODIUM 25 UG/1
25 TABLET ORAL EVERY MORNING
Qty: 30 TABLET | Refills: 2 | Status: SHIPPED | OUTPATIENT
Start: 2025-03-03

## 2025-03-11 DIAGNOSIS — E78.5 DYSLIPIDEMIA: ICD-10-CM

## 2025-03-11 DIAGNOSIS — E06.3 HYPOTHYROIDISM DUE TO HASHIMOTO'S THYROIDITIS: ICD-10-CM

## 2025-03-11 DIAGNOSIS — F41.9 ANXIETY: ICD-10-CM

## 2025-03-11 DIAGNOSIS — F33.0 MAJOR DEPRESSIVE DISORDER, RECURRENT, MILD: ICD-10-CM

## 2025-03-11 RX ORDER — LEVOTHYROXINE SODIUM 25 UG/1
25 TABLET ORAL EVERY MORNING
Qty: 30 TABLET | Refills: 2 | Status: SHIPPED | OUTPATIENT
Start: 2025-03-11

## 2025-03-11 RX ORDER — DESVENLAFAXINE 25 MG/1
25 TABLET, EXTENDED RELEASE ORAL DAILY
Qty: 30 TABLET | Refills: 1 | Status: SHIPPED | OUTPATIENT
Start: 2025-03-11

## 2025-03-11 RX ORDER — ROSUVASTATIN CALCIUM 10 MG/1
10 TABLET, COATED ORAL DAILY
Qty: 90 TABLET | Refills: 1 | Status: SHIPPED | OUTPATIENT
Start: 2025-03-11

## 2025-05-01 ENCOUNTER — OFFICE VISIT (OUTPATIENT)
Dept: FAMILY MEDICINE CLINIC | Facility: CLINIC | Age: 35
End: 2025-05-01
Payer: COMMERCIAL

## 2025-05-01 VITALS
RESPIRATION RATE: 16 BRPM | WEIGHT: 273 LBS | SYSTOLIC BLOOD PRESSURE: 126 MMHG | BODY MASS INDEX: 50.24 KG/M2 | DIASTOLIC BLOOD PRESSURE: 84 MMHG | HEART RATE: 88 BPM | OXYGEN SATURATION: 100 % | HEIGHT: 62 IN

## 2025-05-01 DIAGNOSIS — F90.0 ATTENTION DEFICIT HYPERACTIVITY DISORDER (ADHD), PREDOMINANTLY INATTENTIVE TYPE: Primary | ICD-10-CM

## 2025-05-01 RX ORDER — ATOMOXETINE 25 MG/1
25 CAPSULE ORAL DAILY
Qty: 30 CAPSULE | Refills: 1 | Status: SHIPPED | OUTPATIENT
Start: 2025-05-01

## 2025-05-01 NOTE — PROGRESS NOTES
"                      Established Patient        Chief Complaint:   Chief Complaint   Patient presents with    ADHD     Pt is here for labs and adhd medicine.          History of Present Illness  The patient is a 34-year-old female presenting for ADHD evaluation.    She reports difficulties with executive functions, particularly initiating tasks, affecting both home and work life, more pronounced at home. No childhood ADHD diagnosis; was in a gifted program. Her brother was diagnosed with ADHD 2 years ago. Prefers non-stimulant medications.    Currently on Pristiq for anxiety, which she finds beneficial. Her mother passed away a month ago, and her father a year ago.    FAMILY HISTORY  Brother diagnosed with ADHD 2 years ago. Mother and father both passed away at age 69.      Subjective     The following portions of the patient's history were reviewed and updated as appropriate: allergies, current medications, past family history, past medical history, past social history, past surgical history and problem list.    ALLERGIES  No Known Allergies    Review of Systems  Gen- No fevers, chills  HEENT--No runny nose, congestion, sore throat, ear pain  CV- No chest pain, palpitations  Resp- No cough, dyspnea  GI- No N/V/D, abd pain  -No dysuria, flank pain, difficulty urinating  Musc-No tenderness, swelling, decreased ROM  Neuro-No dizziness, headaches  Psych-No SI/HI, sleep disturbance, +decreased concentration    Objective     Vital Signs:   /84   Pulse 88   Resp 16   Ht 157.5 cm (62\")   Wt 124 kg (273 lb)   SpO2 100%   BMI 49.93 kg/m²          Physical Exam   Physical Exam  Vitals and nursing note reviewed.   HENT:      Mouth/Throat:      Lips: Pink.   Eyes:      General: Lids are normal.   Cardiovascular:      Rate and Rhythm: Normal rate and regular rhythm.   Pulmonary:      Effort: Pulmonary effort is normal.      Breath sounds: Normal breath sounds.   Neurological:      Mental Status: She is alert and " oriented to person, place, and time.      Gait: Gait is intact.   Psychiatric:         Attention and Perception: Attention normal.         Mood and Affect: Mood and affect normal.         Speech: Speech normal.         Behavior: Behavior normal. Behavior is cooperative.         Assessment and Plan      Assessment/Plan:   Diagnoses and all orders for this visit:    1. Attention deficit hyperactivity disorder (ADHD), predominantly inattentive type (Primary)  -     atomoxetine (Strattera) 25 MG capsule; Take 1 capsule by mouth Daily.  Dispense: 30 capsule; Refill: 1    Risks, benefits, and potential side effects of current/new medications reviewed with patient.  Patient voiced understanding and wished to proceed with treatment.    Patient was encouraged to keep me informed of any acute changes, lack of improvement, or any new concerning symptoms.      Assessment & Plan  ADHD  - Difficulty with executive functions, particularly initiating tasks, affecting home and work life  - No childhood ADHD diagnosis; gifted program participant  - Discussed non-stimulant approach due to potential side effects of stimulants, especially with anxiety history  - Prescribed Strattera 25 mg daily for one month; may increase to 40 mg if well-tolerated  - Prescription sent to [facility] in [place]  - Advised to take medication at the same time daily and monitor for side effects  - Follow-up in one month to assess effectiveness and tolerance    Anxiety  - Currently taking Pristiq, reports significant improvement in managing symptoms  - Improved handling of stressful events, such as the recent passing of her mother  - Continue current Pristiq regimen    Discussion Summary:  Discussed plan of care in detail with pt today; pt verb understanding and agrees.        I have reviewed and updated all copied forward information, as appropriate.  I attest to the accuracy and relevance of any unchanged information.      Follow up:  Return in about 4 weeks  (around 5/29/2025).     Patient Education:  There are no Patient Instructions on file for this visit.    Patient or patient representative verbalized consent for the use of Ambient Listening during the visit with  GRACIELA Jaffe for chart documentation. 5/18/2025  17:07 EDT    GRACIELA Jaffe  05/18/25  22:12 EDT          Please note that portions of this note may have been completed with a voice recognition program.

## 2025-05-29 ENCOUNTER — TELEMEDICINE (OUTPATIENT)
Dept: FAMILY MEDICINE CLINIC | Facility: CLINIC | Age: 35
End: 2025-05-29
Payer: COMMERCIAL

## 2025-05-29 DIAGNOSIS — F90.2 ATTENTION DEFICIT HYPERACTIVITY DISORDER (ADHD), COMBINED TYPE: Primary | ICD-10-CM

## 2025-05-29 DIAGNOSIS — F41.9 ANXIETY: ICD-10-CM

## 2025-05-29 RX ORDER — ATOMOXETINE 40 MG/1
40 CAPSULE ORAL DAILY
Qty: 30 CAPSULE | Refills: 2 | Status: SHIPPED | OUTPATIENT
Start: 2025-05-29

## 2025-05-29 NOTE — PROGRESS NOTES
Established Patient        Chief Complaint:   Chief Complaint   Patient presents with    Anxiety    ADHD     Started on strattera at last appointment.       You have chosen to receive care through a telephone visit. Do you consent to use a telephone visit for your medical care today? Yes    History of Present Illness  The patient presents via virtual visit for evaluation of anxiety.    She has been responding positively to 25 mg Strattera, with improved condition. She reports unusual sleep patterns and dreams, possibly side effects of the medication. 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 Despite these issues, she notes increased productivity.    Her anxiety levels have been manageable, although she experienced increased stress recently due to work-related issues, including a temporary job termination and housing concerns. She has been without hot water for a week, contributing to her stress. She reports no thoughts of self-harm or harm to others.    At the time of encounter today, patient is located in her home in [place], [place] and provider is located in [facility] [place], [place].   Subjective     The following portions of the patient's history were reviewed and updated as appropriate: allergies, current medications, past family history, past medical history, past social history, past surgical history and problem list.    ALLERGIES  No Known Allergies    ROS  Review of Systems  Gen- No fevers, chills  HEENT--No runny nose, congestion, sore throat, ear pain  CV- No chest pain, palpitations  Resp- No cough, dyspnea  GI- No N/V/D, abd pain  -No dysuria, flank pain, difficulty urinating  Musc-No tenderness, swelling, decreased ROM  Neuro-No dizziness, headaches  Psych-No SI/HI, +sleep disturbance    Objective     Vital Signs:   There were no vitals taken for this visit.       Physical Exam   Physical Exam  Constitutional:       Appearance: Normal appearance.   Pulmonary:       Effort: Pulmonary effort is normal.   Neurological:      Mental Status: She is alert and oriented to person, place, and time.   Psychiatric:         Mood and Affect: Mood normal.         Behavior: Behavior normal.         Physical Exam        Assessment and Plan      Assessment/Plan:   Diagnoses and all orders for this visit:    1. Attention deficit hyperactivity disorder (ADHD), combined type (Primary)  -     atomoxetine (Strattera) 40 MG capsule; Take 1 capsule by mouth Daily.  Dispense: 30 capsule; Refill: 2    2. Anxiety    Risks, benefits, and potential side effects of current/new medications reviewed with patient.  Patient voiced understanding and wished to proceed with treatment.    Discussed need for stress/anxiety reducing techniques such as prayer/meditation/breathing and counting exercises and avoidance of stress producing environments/situations; will follow clinically.    Patient was encouraged to keep me informed of any acute changes, lack of improvement, or any new concerning symptoms.      Assessment & Plan  Anxiety  - Anxiety manageable despite recent stressors such as job insecurity and housing issues  - No thoughts of self-harm or harm to others  - Discussed recent life events contributing to stress and anxiety  - Encouraged to reach out if anxiety worsens or new concerns arise    Medication management  - Tolerating Strattera 25 mg but experiencing irregular sleep and vivid dreams, possibly due to inconsistent timing of medication intake  - Advised to take medication earlier in the day to minimize sleep disturbances  - Prescription for Strattera 40 mg issued, with instructions to maintain this dosage for 3 months  - Follow-up scheduled in 3 months, with telehealth option if needed    Discussion Summary:  Discussed plan of care in detail with pt today; pt verb understanding and agrees.    Patient or patient representative verbalized consent for the use of Ambient Listening during the visit with   GRACIELA Jaffe for chart documentation. 6/12/2025  17:07 EDT        I have reviewed and updated all copied forward information, as appropriate.  I attest to the accuracy and relevance of any unchanged information.    This was an audio and video enabled telemedicine encounter.      Follow up:  Return in about 3 months (around 8/29/2025).     Patient Education:  There are no Patient Instructions on file for this visit.    GRACIELA Jaffe  06/12/25  09:08 EDT          Please note that portions of this note may have been completed with a voice recognition program.

## 2025-06-15 DIAGNOSIS — F41.9 ANXIETY: ICD-10-CM

## 2025-06-15 DIAGNOSIS — F33.0 MAJOR DEPRESSIVE DISORDER, RECURRENT, MILD: ICD-10-CM

## 2025-06-16 RX ORDER — DESVENLAFAXINE 25 MG/1
25 TABLET, EXTENDED RELEASE ORAL DAILY
Qty: 30 TABLET | Refills: 1 | Status: SHIPPED | OUTPATIENT
Start: 2025-06-16

## 2025-07-22 DIAGNOSIS — E78.5 DYSLIPIDEMIA: ICD-10-CM

## 2025-07-22 RX ORDER — ROSUVASTATIN CALCIUM 10 MG/1
10 TABLET, COATED ORAL DAILY
Qty: 90 TABLET | Refills: 1 | Status: SHIPPED | OUTPATIENT
Start: 2025-07-22

## 2025-08-18 DIAGNOSIS — F41.9 ANXIETY: ICD-10-CM

## 2025-08-18 DIAGNOSIS — F33.0 MAJOR DEPRESSIVE DISORDER, RECURRENT, MILD: ICD-10-CM

## 2025-08-18 RX ORDER — DESVENLAFAXINE 25 MG/1
25 TABLET, EXTENDED RELEASE ORAL DAILY
Qty: 30 TABLET | Refills: 1 | Status: SHIPPED | OUTPATIENT
Start: 2025-08-18